# Patient Record
Sex: MALE | Race: BLACK OR AFRICAN AMERICAN
[De-identification: names, ages, dates, MRNs, and addresses within clinical notes are randomized per-mention and may not be internally consistent; named-entity substitution may affect disease eponyms.]

---

## 2021-03-12 ENCOUNTER — HOSPITAL ENCOUNTER (EMERGENCY)
Dept: HOSPITAL 56 - MW.ED | Age: 21
Discharge: HOME | End: 2021-03-12
Payer: COMMERCIAL

## 2021-03-12 DIAGNOSIS — S60.212A: Primary | ICD-10-CM
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
## 2021-03-12 NOTE — CR
Indication:



Injury and pain



Technique:



Left wrist 3 view



Comparison:



None



Findings:



Bones: Alignment is normal. No fractures or bone lesions.  



Joint spaces: Unremarkable.  



Soft tissues: Unremarkable.  



Impression:



No sign of acute injury in the left wrist.



Dictated by Michelet Ring MD @ Mar 12 2021 11:00AM



Signed by Dr. Michelet Ring @ Mar 12 2021 11:01AM
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
